# Patient Record
Sex: FEMALE | Race: ASIAN
[De-identification: names, ages, dates, MRNs, and addresses within clinical notes are randomized per-mention and may not be internally consistent; named-entity substitution may affect disease eponyms.]

---

## 2018-08-07 ENCOUNTER — HOSPITAL ENCOUNTER (OUTPATIENT)
Age: 63
Discharge: HOME | End: 2018-08-07

## 2018-08-07 ENCOUNTER — HOSPITAL ENCOUNTER (OUTPATIENT)
Dept: HOSPITAL 91 - SDS | Age: 63
Discharge: HOME | End: 2018-08-07
Payer: COMMERCIAL

## 2018-08-07 DIAGNOSIS — I48.91: ICD-10-CM

## 2018-08-07 DIAGNOSIS — E78.5: ICD-10-CM

## 2018-08-07 DIAGNOSIS — I20.9: Primary | ICD-10-CM

## 2018-08-07 DIAGNOSIS — I10: ICD-10-CM

## 2018-08-07 DIAGNOSIS — R94.31: ICD-10-CM

## 2018-08-07 PROCEDURE — 93458 L HRT ARTERY/VENTRICLE ANGIO: CPT

## 2018-08-07 PROCEDURE — 93005 ELECTROCARDIOGRAM TRACING: CPT

## 2018-08-07 RX ADMIN — DIPHENHYDRAMINE HYDROCHLORIDE 1 MG: 50 INJECTION, SOLUTION INTRAMUSCULAR; INTRAVENOUS at 14:53

## 2018-08-07 RX ADMIN — FAMOTIDINE 1 MG: 10 INJECTION, SOLUTION INTRAVENOUS at 14:53

## 2018-08-07 RX ADMIN — METHYLPREDNISOLONE SODIUM SUCCINATE 1 MG: 125 INJECTION, POWDER, FOR SOLUTION INTRAMUSCULAR; INTRAVENOUS at 15:02

## 2019-04-30 ENCOUNTER — HOSPITAL ENCOUNTER (OUTPATIENT)
Dept: HOSPITAL 10 - SDS | Age: 64
Discharge: HOME | End: 2019-04-30
Attending: SURGERY
Payer: COMMERCIAL

## 2019-04-30 ENCOUNTER — HOSPITAL ENCOUNTER (OUTPATIENT)
Dept: HOSPITAL 91 - SDS | Age: 64
Discharge: HOME | End: 2019-04-30
Payer: COMMERCIAL

## 2019-04-30 VITALS — DIASTOLIC BLOOD PRESSURE: 83 MMHG | HEART RATE: 68 BPM | RESPIRATION RATE: 23 BRPM | SYSTOLIC BLOOD PRESSURE: 138 MMHG

## 2019-04-30 VITALS — BODY MASS INDEX: 18.83 KG/M2 | HEIGHT: 55 IN | WEIGHT: 81.35 LBS

## 2019-04-30 VITALS — SYSTOLIC BLOOD PRESSURE: 104 MMHG | HEART RATE: 64 BPM | RESPIRATION RATE: 20 BRPM | DIASTOLIC BLOOD PRESSURE: 66 MMHG

## 2019-04-30 VITALS — DIASTOLIC BLOOD PRESSURE: 67 MMHG | RESPIRATION RATE: 20 BRPM | HEART RATE: 66 BPM | SYSTOLIC BLOOD PRESSURE: 113 MMHG

## 2019-04-30 VITALS — HEART RATE: 66 BPM | SYSTOLIC BLOOD PRESSURE: 134 MMHG | RESPIRATION RATE: 22 BRPM | DIASTOLIC BLOOD PRESSURE: 70 MMHG

## 2019-04-30 VITALS — SYSTOLIC BLOOD PRESSURE: 108 MMHG | HEART RATE: 64 BPM | RESPIRATION RATE: 28 BRPM | DIASTOLIC BLOOD PRESSURE: 59 MMHG

## 2019-04-30 VITALS — RESPIRATION RATE: 21 BRPM | DIASTOLIC BLOOD PRESSURE: 67 MMHG | HEART RATE: 64 BPM | SYSTOLIC BLOOD PRESSURE: 112 MMHG

## 2019-04-30 VITALS — DIASTOLIC BLOOD PRESSURE: 67 MMHG | HEART RATE: 62 BPM | SYSTOLIC BLOOD PRESSURE: 104 MMHG | RESPIRATION RATE: 21 BRPM

## 2019-04-30 VITALS — SYSTOLIC BLOOD PRESSURE: 104 MMHG | HEART RATE: 76 BPM | RESPIRATION RATE: 18 BRPM | DIASTOLIC BLOOD PRESSURE: 55 MMHG

## 2019-04-30 VITALS — HEART RATE: 64 BPM | DIASTOLIC BLOOD PRESSURE: 65 MMHG | SYSTOLIC BLOOD PRESSURE: 105 MMHG | RESPIRATION RATE: 20 BRPM

## 2019-04-30 VITALS — SYSTOLIC BLOOD PRESSURE: 137 MMHG | DIASTOLIC BLOOD PRESSURE: 97 MMHG | HEART RATE: 82 BPM | RESPIRATION RATE: 22 BRPM

## 2019-04-30 VITALS — HEART RATE: 64 BPM | RESPIRATION RATE: 20 BRPM | DIASTOLIC BLOOD PRESSURE: 72 MMHG | SYSTOLIC BLOOD PRESSURE: 124 MMHG

## 2019-04-30 VITALS — HEART RATE: 76 BPM | RESPIRATION RATE: 22 BRPM | DIASTOLIC BLOOD PRESSURE: 84 MMHG | SYSTOLIC BLOOD PRESSURE: 137 MMHG

## 2019-04-30 VITALS — RESPIRATION RATE: 23 BRPM | SYSTOLIC BLOOD PRESSURE: 169 MMHG | DIASTOLIC BLOOD PRESSURE: 94 MMHG | HEART RATE: 88 BPM

## 2019-04-30 VITALS — SYSTOLIC BLOOD PRESSURE: 135 MMHG | RESPIRATION RATE: 18 BRPM | DIASTOLIC BLOOD PRESSURE: 79 MMHG | HEART RATE: 77 BPM

## 2019-04-30 VITALS — DIASTOLIC BLOOD PRESSURE: 78 MMHG | HEART RATE: 74 BPM | RESPIRATION RATE: 17 BRPM | SYSTOLIC BLOOD PRESSURE: 175 MMHG

## 2019-04-30 VITALS — HEART RATE: 76 BPM | SYSTOLIC BLOOD PRESSURE: 107 MMHG | DIASTOLIC BLOOD PRESSURE: 67 MMHG | RESPIRATION RATE: 18 BRPM

## 2019-04-30 VITALS — DIASTOLIC BLOOD PRESSURE: 86 MMHG | RESPIRATION RATE: 20 BRPM | SYSTOLIC BLOOD PRESSURE: 155 MMHG | HEART RATE: 72 BPM

## 2019-04-30 VITALS — RESPIRATION RATE: 23 BRPM | SYSTOLIC BLOOD PRESSURE: 113 MMHG | DIASTOLIC BLOOD PRESSURE: 67 MMHG | HEART RATE: 64 BPM

## 2019-04-30 VITALS — HEART RATE: 74 BPM | RESPIRATION RATE: 23 BRPM | DIASTOLIC BLOOD PRESSURE: 68 MMHG | SYSTOLIC BLOOD PRESSURE: 105 MMHG

## 2019-04-30 VITALS — RESPIRATION RATE: 15 BRPM | HEART RATE: 74 BPM | DIASTOLIC BLOOD PRESSURE: 52 MMHG | SYSTOLIC BLOOD PRESSURE: 102 MMHG

## 2019-04-30 VITALS — SYSTOLIC BLOOD PRESSURE: 99 MMHG | RESPIRATION RATE: 20 BRPM | DIASTOLIC BLOOD PRESSURE: 59 MMHG

## 2019-04-30 VITALS — DIASTOLIC BLOOD PRESSURE: 89 MMHG | SYSTOLIC BLOOD PRESSURE: 153 MMHG | RESPIRATION RATE: 21 BRPM | HEART RATE: 82 BPM

## 2019-04-30 VITALS — DIASTOLIC BLOOD PRESSURE: 59 MMHG | RESPIRATION RATE: 22 BRPM | HEART RATE: 82 BPM | SYSTOLIC BLOOD PRESSURE: 99 MMHG

## 2019-04-30 VITALS — DIASTOLIC BLOOD PRESSURE: 50 MMHG | HEART RATE: 72 BPM | RESPIRATION RATE: 15 BRPM | SYSTOLIC BLOOD PRESSURE: 99 MMHG

## 2019-04-30 VITALS — SYSTOLIC BLOOD PRESSURE: 105 MMHG | HEART RATE: 64 BPM | DIASTOLIC BLOOD PRESSURE: 67 MMHG | RESPIRATION RATE: 22 BRPM

## 2019-04-30 VITALS — DIASTOLIC BLOOD PRESSURE: 66 MMHG | HEART RATE: 64 BPM | RESPIRATION RATE: 20 BRPM | SYSTOLIC BLOOD PRESSURE: 119 MMHG

## 2019-04-30 VITALS — SYSTOLIC BLOOD PRESSURE: 119 MMHG | DIASTOLIC BLOOD PRESSURE: 65 MMHG | HEART RATE: 58 BPM | RESPIRATION RATE: 23 BRPM

## 2019-04-30 DIAGNOSIS — I11.0: ICD-10-CM

## 2019-04-30 DIAGNOSIS — I50.9: ICD-10-CM

## 2019-04-30 DIAGNOSIS — K80.10: Primary | ICD-10-CM

## 2019-04-30 PROCEDURE — 86850 RBC ANTIBODY SCREEN: CPT

## 2019-04-30 PROCEDURE — 88304 TISSUE EXAM BY PATHOLOGIST: CPT

## 2019-04-30 PROCEDURE — 86901 BLOOD TYPING SEROLOGIC RH(D): CPT

## 2019-04-30 PROCEDURE — 47562 LAPAROSCOPIC CHOLECYSTECTOMY: CPT

## 2019-04-30 PROCEDURE — 86900 BLOOD TYPING SEROLOGIC ABO: CPT

## 2019-04-30 RX ADMIN — CEFAZOLIN SODIUM 1 MLS/HR: 2 SOLUTION INTRAVENOUS at 07:36

## 2019-04-30 RX ADMIN — ONDANSETRON HYDROCHLORIDE 1 MG: 2 INJECTION, SOLUTION INTRAMUSCULAR; INTRAVENOUS at 09:37

## 2019-04-30 RX ADMIN — HYDROMORPHONE HYDROCHLORIDE 1 MG: 2 INJECTION INTRAMUSCULAR; INTRAVENOUS; SUBCUTANEOUS at 09:37

## 2019-04-30 RX ADMIN — KETOROLAC TROMETHAMINE 1 MG: 30 INJECTION, SOLUTION INTRAMUSCULAR at 13:14

## 2019-04-30 RX ADMIN — PYRIDOXINE HYDROCHLORIDE 1 MLS/HR: 100 INJECTION, SOLUTION INTRAMUSCULAR; INTRAVENOUS at 06:30

## 2019-04-30 RX ADMIN — DEXAMETHASONE SODIUM PHOSPHATE PRN MG: 10 INJECTION, SOLUTION INTRAMUSCULAR; INTRAVENOUS at 10:45

## 2019-04-30 RX ADMIN — DEXAMETHASONE SODIUM PHOSPHATE PRN MG: 10 INJECTION, SOLUTION INTRAMUSCULAR; INTRAVENOUS at 09:37

## 2019-04-30 RX ADMIN — THIAMINE HYDROCHLORIDE 1 MLS/HR: 100 INJECTION, SOLUTION INTRAMUSCULAR; INTRAVENOUS at 06:30

## 2019-04-30 RX ADMIN — ONDANSETRON HYDROCHLORIDE 1 MG: 2 INJECTION, SOLUTION INTRAMUSCULAR; INTRAVENOUS at 10:45

## 2019-04-30 RX ADMIN — BUPIVACAINE HYDROCHLORIDE AND EPINEPHRINE BITARTRATE 1 ML: 2.5; .005 INJECTION, SOLUTION EPIDURAL; INFILTRATION; INTRACAUDAL; PERINEURAL at 07:30

## 2019-04-30 RX ADMIN — HYDROCODONE BITARTRATE AND ACETAMINOPHEN 1 TAB: 5; 325 TABLET ORAL at 12:40

## 2019-04-30 NOTE — OPR
Date/Time of Note


Date/Time of Note


DATE: 4/30/19 


TIME: 08:47





Operative Report


Preoperative Diagnosis


Symptomatic Cholelithiasis


Postoperative Diagnosis


acute on chronic cholecystitis


Hydrops gallbladder


Operation/Procedure Performed


Laparoscopic Cholecystectomy


Surgeon


see signature line


Assistant


none


Anesthesia Type:  general


Estimated Blood Loss:  minimal


Transfusion


   none


Specimen


gallbladder


Grafts/Implants


none


Tubes/Drains


none


Complications


none


Indications


This is a 64-year-old female with symptomatic cholelithiasis, and taken to the 


operating room today for definitive surgical care.  Informed consent was 


obtained.


Procedure Description


She was brought to the operating room and placed supine on the operating table. 


After adequate IV sedation was provided a timeout was performed perioperative 


antibiotics were given.  Sequential compression devices were already in place 


prior to induction of anesthesia.  At this point patient was successfully 


intubated the abdomen was prepped and draped as per standard fashion.  Next a 


supraumbilical incision was chosen.  After infiltration with Marcaine 5 mm 


incision was made Optiview access into the abdomen was obtained without any 


difficulty.  Patient was next insufflated to preset pressures.  The remaining 


trochars were placed as per routine.  Patient was placed in reverse 


Trendelenburg with turning to the left.  The gallbladder was very distended and 


Kline was compatible with a hydrops gallbladder.  Cystic duct and artery were 


both skeletonized until the critical view was obtained.  There were both clipped


and divided as per routine.  The gallbladder was  from the liver bed 


using the hook electrocautery aspirate obtained.  The liver bed was completely 


cauterized to keep it hemostatic.  The gallbladder was next placed into an 


endoscopic catch bag and removed from the subxiphoid 12 mm incision site.  The 


area was irrigated and washed out.  The 12 m trocar site was closed with a 


trans-fascial 0 Vicryl suture.  The skin incisions were infiltrated with 


Marcaine abdomen was deflated everything was completely hemostatic.  Skin 


incisions were closed with Monocryl suture.  They were all max covered with 


Dermabond.  At this point patient tolerated the procedure well there were no 


intraoperative complications..  The sponge needle and instrument counts were 


reported to be correct by the nursing staff.











MARGARET HARRIS                Apr 30, 2019 08:51

## 2019-04-30 NOTE — SIPON
Date/Time of Note


Date/Time of Note


DATE: 4/30/19 


TIME: 08:46





Operative Report


Preoperative Diagnosis


symptomatic cholelithiasis


Postoperative Diagnosis


Acute on chronic cholecystitis


Operation/Procedure Performed


Laparoscopic cholecystectomy


Surgeon


see signature line


First assist


none


Anesthesia:  general


Estimated blood loss:  minimal


Transfusion Required


   none


Specimen


gallbladder


Grafts/Implants


none


Complications


none











MARGARET HARRIS                Apr 30, 2019 08:47

## 2019-04-30 NOTE — PDOCDIS
Discharge Instructions


CONDITION


                 [id number]
Patient Condition:  [id number]
Good








HOME CARE INSTRUCTIONS:


         Cris
Diet Instructions:  Aneesh
Low Fat /Cholesterol








ACTIVITY:


          [id number]
Activity Restrictions:  Aneesh
No Restrictions














MARGARET HARRIS                Apr 30, 2019 08:55

## 2019-04-30 NOTE — PREAC
Date/Time of Note


Date/Time of Note


DATE: 4/30/19 


TIME: 07:30





Anesthesia Eval and Record


Evaluation


Time Pre-Procedure Interview


DATE: 4/30/19 


TIME: 07:30


Age


64


Sex


female


NPO:  8 hrs


Preoperative diagnosis


Cholelithiasis


Planned procedure


Lap Cholecystectomy





Past Medical History


Past Medical History:  Includes


Cardio:  HTN, Dyslipidemia, CHF





Surgery & Anesthesia Issues


No known issue





Meds


Anticoagulation:  No


Beta Blocker within 24 hr:  Yes


Reported Medications


Losartan Potassium* (Cozaar*) 50 Mg Tablet, 50 MG PO DAILY, #30 TAB


   4/30/19


Amlodipine Besylate* (Norvasc*) 5 Mg Tablet, 5 MG PO BID, TAB


   4/30/19


Atorvastatin Calcium* (Atorvastatin Calcium*) 20 Mg Tablet, 20 MG PO QHS, #30 


TAB


   4/30/19


Metoprolol Succinate* (Toprol XL*) 25 Mg Tab.sr.24h, 25 MG PO BID, #30 TAB


   4/30/19


Digoxin* (Lanoxin*) 0.125 Mg Tablet, 0.125 MG PO DAILY, TAB


   4/30/19


Discontinued Reported Medications


Losartan Potassium* (Cozaar*) 50 Mg Tablet, 50 MG PO DAILY, #30 TAB


   8/7/18


Amlodipine Besylate* (Norvasc*) 5 Mg Tablet, 5 MG PO DAILY, TAB


   8/7/18


Atorvastatin Calcium* (Atorvastatin Calcium*) 20 Mg Tablet, 20 MG PO QHS, #30 


TAB


   8/7/18


Metoprolol Succinate* (Toprol XL*) 25 Mg Tab.sr.24h, 25 MG PO DAILY, #30 TAB


   8/7/18


Digoxin* (Lanoxin*) 0.125 Mg Tablet, 0.125 MG PO DAILY, TAB


   8/7/18





Current Medications


Lactated Ringer's 1,000 ml @  25 mls/hr Q24H IV ;  Start 4/30/19 at 06:30;  Stop


4/30/19 at 16:00


Sodium Chloride 1,000 ml @  25 mls/hr Q24H IV ;  Start 4/30/19 at 06:30;  Stop 


4/30/19 at 16:00


Meds reviewed:  Yes





Allergies


Uncoded Allergies:  


     CONTRAST, DYE (Allergy, Unknown, 8/6/18)


Allergies Reviewed:  Yes





Labs/Studies


Labs Reviewed:  Reviewed by anesthesiologist


Pregnancy test:  N/A


Studies:  ECG





Pre-procedure Exam


Last vitals





Vital Signs


  Date      Temp  Pulse  Resp  B/P (MAP)   Pulse Ox  O2          O2 Flow    FiO2


Time                                                 Delivery    Rate


   4/30/19  98.4     77    18      135/79        99  Room Air


     06:51                           (97)





Airway:  Adequate mouth opening, Adequate thyromental dist


Mallampati:  Mallampati II


Teeth:  Normal


Lung:  Normal


Heart:  Normal





ASA Physical Status


ASA physical status:  3


Emergency:  None





Planned Anesthetic


General/MAC:  ETT





Pre-operative Attestations


Prior to commencing anesthesia and surgery, the patient was re-evaluated, there 


was verification of:


*The patient's identity


*The results of appropriate recent lab work and preoperative vital signs


*The above evaluation not changing prior to induction


*Anesthetic plan, risk benefits, alternative and complications discussed with 


patient/family; questions answered; patient/family understands, accepts and 


wishes to proceed.











VIRAJ PARKER MD              Apr 30, 2019 07:33

## 2019-04-30 NOTE — HPN
Date/Time of Note


Date/Time of Note


DATE: 4/30/19 


TIME: 07:36





Interval H&P Admission Note


Pt. seen H&P reviewed:  No system changes











MARGARET HARRIS                Apr 30, 2019 07:36

## 2019-04-30 NOTE — PAC
Date/Time of Note


Date/Time of Note


DATE: 4/30/19 


TIME: 09:07





Post-Anesthesia Notes


Post-Anesthesia Note


Last documented vital signs





Vital Signs


  Date      Temp  Pulse  Resp  B/P (MAP)   Pulse Ox  O2          O2 Flow    FiO2


Time                                                 Delivery    Rate


   4/30/19  98.4     77    18      135/79        99  Room Air


     06:51                           (97)





Activity:  WNL


Respiratory function:  WNL


Cardiovascular function:  WNL


Mental status:  Baseline


Pain reasonably controlled:  Yes


Hydration appropriate:  Yes


Nausea/Vomiting absent:  Yes


Comments


BP:142/67, P:88, Spo2:100%, T:98,8











VIRAJ PARKER MD              Apr 30, 2019 09:08